# Patient Record
Sex: FEMALE | Race: WHITE | URBAN - METROPOLITAN AREA
[De-identification: names, ages, dates, MRNs, and addresses within clinical notes are randomized per-mention and may not be internally consistent; named-entity substitution may affect disease eponyms.]

---

## 2024-10-16 ENCOUNTER — OFFICE VISIT (OUTPATIENT)
Dept: GASTROENTEROLOGY | Facility: CLINIC | Age: 44
End: 2024-10-16

## 2024-10-16 VITALS
HEIGHT: 60 IN | SYSTOLIC BLOOD PRESSURE: 100 MMHG | DIASTOLIC BLOOD PRESSURE: 70 MMHG | BODY MASS INDEX: 27.29 KG/M2 | WEIGHT: 139 LBS

## 2024-10-16 DIAGNOSIS — K58.2 IRRITABLE BOWEL SYNDROME WITH BOTH CONSTIPATION AND DIARRHEA: ICD-10-CM

## 2024-10-16 DIAGNOSIS — A04.8 H. PYLORI INFECTION: ICD-10-CM

## 2024-10-16 DIAGNOSIS — R79.89 ELEVATED LFTS: ICD-10-CM

## 2024-10-16 DIAGNOSIS — R14.0 BLOATING: Primary | ICD-10-CM

## 2024-10-16 PROCEDURE — 99244 OFF/OP CNSLTJ NEW/EST MOD 40: CPT | Performed by: INTERNAL MEDICINE

## 2024-10-16 RX ORDER — LEVOTHYROXINE SODIUM 88 UG/1
1 TABLET ORAL
COMMUNITY
Start: 2024-10-01

## 2024-10-16 NOTE — PATIENT INSTRUCTIONS
1. Bloating  2. Irritable bowel syndrome with both constipation and diarrhea  3. H. pylori infection      Recommend:  RUQ US  Supplement fiber-- metamucil 1/2 dose daily to see if helps regular bowels  Continue water and exercise  Check Hpylori and celiac given irregular stools and elevated LFTs  Repeat LFTs in 3 months  If still elevated consider chronic liver disease workup and RUQ US

## 2024-10-16 NOTE — H&P
[facility] - Gastroenterology  Clinic History and Physical     Chief Complaint   Patient presents with    Consult     Referred for abnormal labs - does have abdominal discomfort        HPI:   Lady Hector Luis is a 44 year old female patient of Dr. Perales, with history of hashimotos, connective tissue disease, presenting for bloating and abnormal liver labs    Not on medication for connective tissue currently  Just thyroid medication  ALT 31    ARRON positive  Thyroid noted with positive ab to thyroglobulin and thyroid peroxidase    No family history of liver disease but does not know father's side  Denies alcohol-- rare social  No IV drugs and no hepatitis history  One tattoo in the store clean    Does have IBS and history of Hpylori  Irregular constipation and diarrhea  No blood in the stool    Patient denies any GI symptoms of nausea, vomiting, dyspepsia, dysphagia, hematemesis, abdominal pain, change in bowel habits, thin stools, hematochezia, or melena.  Additionally there is no weight loss and no reported history of chest pain or shortness of breath.    Family History:  Denies liver disease  Denies colon CA    Prior endoscopies:  colonoscopy-- 10 years  EGD 2 years ago    Soc:  Denies smoking or drugs  Rare alcohol       History, Medications, Allergies, ROS:      History reviewed. No pertinent past medical history.   History reviewed. No pertinent surgical history.   Family Hx: History reviewed. No pertinent family history.   Social History:   Social History     Socioeconomic History    Marital status: Single   Tobacco Use    Smoking status: Former     Types: Cigarettes    Smokeless tobacco: Never   Substance and Sexual Activity    Alcohol use: Not Currently    Drug use: Never     Social Drivers of Health     Financial Resource Strain: Low Risk  (10/8/2024)    Received from [facility]    Overall Financial Resource Strain (CARDIA)     Difficulty of Paying  Living Expenses: Not very hard   Food Insecurity: No Food Insecurity (10/8/2024)    Received from [facility]    Hunger Vital Sign     Worried About Running Out of Food in the Last Year: Never true     Ran Out of Food in the Last Year: Never true   Transportation Needs: No Transportation Needs (10/8/2024)    Received from [facility]    PRAPARE - Transportation     Lack of Transportation (Medical): No     Lack of Transportation (Non-Medical): No   Housing Stability: Low Risk  (10/8/2024)    Received from [facility]    Housing Stability Vital Sign     Unable to Pay for Housing in the Last Year: No     Number of Times Moved in the Last Year: 1     Homeless in the Last Year: No        Medications (Active prior to today's visit):  Current Outpatient Medications   Medication Sig Dispense Refill    levothyroxine 88 MCG Oral Tab Take 1 tablet (88 mcg total) by mouth before breakfast.         Allergies:  Allergies[1]    ROS:   CONSTITUTIONAL:  negative for fevers, rigors  EYES:  negative for diplopia   RESPIRATORY:  negative for severe shortness of breath  CARDIOVASCULAR:  negative for crushing sub-sternal chest pain  GASTROINTESTINAL:  see HPI  GENITOURINARY:  negative for dysuria or gross hematuria  INTEGUMENT/BREAST:  SKIN:  negative for jaundice   ALLERGIC/IMMUNOLOGIC:  negative for hay fever  ENDOCRINE:  negative for cold intolerance and heat intolerance  MUSCULOSKELETAL:  negative for joint effusion/severe erythema  BEHAVIOR/PSYCH:  negative for psychotic behavior      PHYSICAL EXAM:   /70 (BP Location: Right arm, Patient Position: Sitting, Cuff Size: adult)   Ht 5' (1.524 m)   Wt 139 lb (63 kg)   BMI 27.15 kg/m²       Gen: Patient appears comfortable and in no acute discomfort  HEENT: the sclera appears anicteric, oropharynx clear, mucus membranes appear moist  CV:  the extremities are warm and well perfused   Lung: Moves air well; No labored  breathing  Abdomen: soft, non-tender exam in all quadrants without rigidity or guarding, non-distended, no abnormal bowel sounds noted, no masses are palpated  Skin: No jaundice  Ext: no cyanosis, clubbing or edema is evident.   Neuro: Alert and interactive, and gross movements of extremities normal    Labs/Imaging:   Patient's labs and imaging were reviewed and discussed with patient today.      No results for input(s)  in the last 3 years  No results found for: \"GLU\", \"BUN\", \"BUNCREA\", \"CREATSERUM\", \"ANIONGAP\", \"GFR\", \"GFRNAA\", \"GFRAA\", \"CA\", \"OSMOCALC\", \"ALKPHO\", \"AST\", \"ALT\", \"ALKPHOS\", \"BILT\", \"TP\", \"ALB\", \"GLOBULIN\", \"AGRATIO\", \"NA\", \"K\", \"CL\", \"CO2\"  No results found for: \"PTP\", \"PT\", \"INR\"  ESR normal  Complement levels normal    ASSESSMENT/PLAN:       1. Bloating    2. Irritable bowel syndrome with both constipation and diarrhea    3. H. pylori infection      Recommend:  RUQ US  Supplement fiber-- metamucil 1/2 dose daily to see if helps regular bowels  Continue water and exercise  Check Hpylori and celiac given irregular stools and elevated LFTs  Repeat LFTs in 3 months  If still elevated consider chronic liver disease workup and RUQ US      Orders This Visit:  No orders of the defined types were placed in this encounter.      Meds This Visit:  Requested Prescriptions      No prescriptions requested or ordered in this encounter       Imaging & Referrals:  None         Yessenia Glasgow MD  10/16/2024         [1] No Known Allergies

## 2024-11-18 ENCOUNTER — HOSPITAL ENCOUNTER (OUTPATIENT)
Dept: ULTRASOUND IMAGING | Age: 44
Discharge: HOME | End: 2024-11-18
Attending: INTERNAL MEDICINE
Payer: COMMERCIAL

## 2024-11-18 DIAGNOSIS — R79.89 ELEVATED LFTS: ICD-10-CM

## 2024-11-18 PROCEDURE — 76705 ECHO EXAM OF ABDOMEN: CPT | Performed by: INTERNAL MEDICINE

## 2024-12-02 ENCOUNTER — TELEPHONE (OUTPATIENT)
Facility: CLINIC | Age: 44
End: 2024-12-02

## 2024-12-02 NOTE — TELEPHONE ENCOUNTER
RN called and spoke to pt with help of  [id number]. Informed pt that US was normal but recommended having the labs done that were ordered at time of October clinic appt.    Pt agreeable to doing testing. Discussed requirements for h pylori breath test. Pt verbalized understanding and had no further needs.    Time on call: 16 min

## 2024-12-18 ENCOUNTER — LAB ENCOUNTER (OUTPATIENT)
Dept: LAB | Facility: HOSPITAL | Age: 44
End: 2024-12-18
Attending: INTERNAL MEDICINE
Payer: COMMERCIAL

## 2024-12-18 DIAGNOSIS — R79.89 ELEVATED LFTS: ICD-10-CM

## 2024-12-18 DIAGNOSIS — L65.9 ALOPECIA, UNSPECIFIED: ICD-10-CM

## 2024-12-18 DIAGNOSIS — I51.9 MYXEDEMA HEART DISEASE: Primary | ICD-10-CM

## 2024-12-18 DIAGNOSIS — E88.819 INSULIN RESISTANCE: ICD-10-CM

## 2024-12-18 DIAGNOSIS — E03.9 MYXEDEMA HEART DISEASE: Primary | ICD-10-CM

## 2024-12-18 DIAGNOSIS — R53.83 FATIGUE: ICD-10-CM

## 2024-12-18 DIAGNOSIS — A04.8 H. PYLORI INFECTION: ICD-10-CM

## 2024-12-18 DIAGNOSIS — E55.9 AVITAMINOSIS D: ICD-10-CM

## 2024-12-18 DIAGNOSIS — E06.3 CHRONIC LYMPHOCYTIC THYROIDITIS: ICD-10-CM

## 2024-12-18 DIAGNOSIS — R14.0 BLOATING: ICD-10-CM

## 2024-12-18 DIAGNOSIS — E88.89 MADELUNG'S NECK (HCC): ICD-10-CM

## 2024-12-18 DIAGNOSIS — K58.2 IRRITABLE BOWEL SYNDROME WITH BOTH CONSTIPATION AND DIARRHEA: ICD-10-CM

## 2024-12-18 LAB
ALBUMIN SERPL-MCNC: 4.4 G/DL (ref 3.2–4.8)
ALBUMIN/GLOB SERPL: 1.5 {RATIO} (ref 1–2)
ALP LIVER SERPL-CCNC: 116 U/L
ALT SERPL-CCNC: 52 U/L
ANION GAP SERPL CALC-SCNC: 6 MMOL/L (ref 0–18)
AST SERPL-CCNC: 29 U/L (ref ?–34)
BILIRUB SERPL-MCNC: 0.4 MG/DL (ref 0.3–1.2)
BUN BLD-MCNC: 20 MG/DL (ref 9–23)
BUN/CREAT SERPL: 25.3 (ref 10–20)
CALCIUM BLD-MCNC: 9.4 MG/DL (ref 8.7–10.4)
CHLORIDE SERPL-SCNC: 110 MMOL/L (ref 98–112)
CO2 SERPL-SCNC: 23 MMOL/L (ref 21–32)
CREAT BLD-MCNC: 0.79 MG/DL
DEPRECATED HBV CORE AB SER IA-ACNC: 21 NG/ML
DHEA-S SERPL-MCNC: 104 UG/DL
EGFRCR SERPLBLD CKD-EPI 2021: 95 ML/MIN/1.73M2 (ref 60–?)
FASTING STATUS PATIENT QL REPORTED: YES
GLOBULIN PLAS-MCNC: 3 G/DL (ref 2–3.5)
GLUCOSE BLD-MCNC: 88 MG/DL (ref 70–99)
HAV IGM SER QL: NONREACTIVE
HBV CORE IGM SER QL: NONREACTIVE
HBV SURFACE AG SERPL QL IA: NONREACTIVE
HCV AB SERPL QL IA: NONREACTIVE
IGA SERPL-MCNC: 311.1 MG/DL (ref 70–312)
OSMOLALITY SERPL CALC.SUM OF ELEC: 290 MOSM/KG (ref 275–295)
POTASSIUM SERPL-SCNC: 3.8 MMOL/L (ref 3.5–5.1)
PROT SERPL-MCNC: 7.4 G/DL (ref 5.7–8.2)
SODIUM SERPL-SCNC: 139 MMOL/L (ref 136–145)
T3FREE SERPL-MCNC: 2.77 PG/ML (ref 2.4–4.2)
T4 FREE SERPL-MCNC: 1.4 NG/DL (ref 0.8–1.7)
TSI SER-ACNC: 1.82 UIU/ML (ref 0.55–4.78)

## 2024-12-18 PROCEDURE — 86364 TISS TRNSGLTMNASE EA IG CLAS: CPT

## 2024-12-18 PROCEDURE — 80053 COMPREHEN METABOLIC PANEL: CPT

## 2024-12-18 PROCEDURE — 83013 H PYLORI (C-13) BREATH: CPT | Performed by: INTERNAL MEDICINE

## 2024-12-18 PROCEDURE — 36415 COLL VENOUS BLD VENIPUNCTURE: CPT

## 2024-12-18 PROCEDURE — 84481 FREE ASSAY (FT-3): CPT

## 2024-12-18 PROCEDURE — 82627 DEHYDROEPIANDROSTERONE: CPT

## 2024-12-18 PROCEDURE — 82728 ASSAY OF FERRITIN: CPT

## 2024-12-18 PROCEDURE — 84681 ASSAY OF C-PEPTIDE: CPT

## 2024-12-18 PROCEDURE — 84443 ASSAY THYROID STIM HORMONE: CPT

## 2024-12-18 PROCEDURE — 82784 ASSAY IGA/IGD/IGG/IGM EACH: CPT

## 2024-12-18 PROCEDURE — 84403 ASSAY OF TOTAL TESTOSTERONE: CPT

## 2024-12-18 PROCEDURE — 80074 ACUTE HEPATITIS PANEL: CPT | Performed by: INTERNAL MEDICINE

## 2024-12-18 PROCEDURE — 84439 ASSAY OF FREE THYROXINE: CPT

## 2024-12-18 PROCEDURE — 84402 ASSAY OF FREE TESTOSTERONE: CPT

## 2024-12-19 LAB
C-PEPTIDE: 2.1 NG/ML
H PYLORI BREATH TEST: POSITIVE

## 2024-12-20 LAB
FREE TESTOST DIRECT: 1 PG/ML
TESTOSTERONE: 19 NG/DL
TTG IGA SER-ACNC: 0.8 U/ML (ref ?–7)

## 2024-12-22 RX ORDER — METRONIDAZOLE 250 MG/1
250 TABLET ORAL 4 TIMES DAILY
Qty: 56 TABLET | Refills: 0 | Status: SHIPPED | OUTPATIENT
Start: 2024-12-22 | End: 2025-01-05

## 2024-12-22 RX ORDER — TETRACYCLINE HYDROCHLORIDE 500 MG/1
500 CAPSULE ORAL 4 TIMES DAILY
Qty: 56 CAPSULE | Refills: 0 | Status: SHIPPED | OUTPATIENT
Start: 2024-12-22 | End: 2025-01-05

## 2024-12-22 RX ORDER — BISMUTH SUBSALICYLATE 262 MG/1
2 TABLET, CHEWABLE ORAL 4 TIMES DAILY
Qty: 112 TABLET | Refills: 0 | Status: SHIPPED | OUTPATIENT
Start: 2024-12-22 | End: 2025-01-05

## 2024-12-24 ENCOUNTER — TELEPHONE (OUTPATIENT)
Facility: CLINIC | Age: 44
End: 2024-12-24

## 2024-12-24 NOTE — TELEPHONE ENCOUNTER
RN called and spoke to pt with the help of  [id number]. Discussed + h pylori infection and recommended treatment. Pt states she picked up two medications from the pharmacy but has not started them yet. She picked up metronidazole and omeprazole. Instructed pt to NOT start meds until all 4 meds are in her possession.    RN called pharmacy, the tetracycline is covered by insurance and will cost approx $25 but med was out of stock and pharmacy had to order it. Med should arrive within the next day or so. Pt will need to purchase OTC pepto.    RN called pt again with  [id number], discussed the above information. Pt wrote down how to take the pepto and will buy it over the counter. Informed her we will contact her in 2 months when she is due for eradication testing. Pt verbalized understanding.    Recall H pylori testing in 10 weeks. Message sent to pt outreach.

## 2024-12-24 NOTE — TELEPHONE ENCOUNTER
----- Message from Yessenia Glasgow sent at 12/22/2024 12:38 PM CST -----  Hpylori positive  Please call and notify patient and medication ordered  Repeat breath test in 10 weeks after completing treatment

## 2024-12-27 ENCOUNTER — TELEPHONE (OUTPATIENT)
Facility: CLINIC | Age: 44
End: 2024-12-27

## 2024-12-27 NOTE — TELEPHONE ENCOUNTER
Spoke to patients   OK per SERGIO    Answered his questions regarding the labs  Assisted with setting up mychart with patient and her     They both verbalized understanding and had no further questions

## 2025-02-18 ENCOUNTER — TELEPHONE (OUTPATIENT)
Facility: CLINIC | Age: 45
End: 2025-02-18

## 2025-02-18 NOTE — TELEPHONE ENCOUNTER
1st,overdue reminder letter mailed out to patient   Labs order  Hepatic Function Panel (7) (Order #[id number]) on 10/16/24

## 2025-03-13 ENCOUNTER — TELEPHONE (OUTPATIENT)
Dept: GASTROENTEROLOGY | Facility: CLINIC | Age: 45
End: 2025-03-13

## 2025-03-13 DIAGNOSIS — A04.8 H. PYLORI INFECTION: Primary | ICD-10-CM

## 2025-03-14 NOTE — TELEPHONE ENCOUNTER
RN called and spoke to pt with help of , discussed that she is due for h pylori eradication testing.    Pt confirmed that she did complete 14 day course of medications but states she has moved to [place] (in February). Pt has established care with a PCP but not a GI yet.     Pt's new PCP is Dr. Sid Burgos, ph #[phone]. RN called and obtained fax #[phone]. Instructed pt to call PCP office next week to make sure order was received or PCP can place new order. Pt agreeable.    RN faxed h pylori breath test order, + result from 12/2024, and treatment used. Received confirmation that the fax was transmitted successfully.

## (undated) NOTE — LETTER
2/18/2025          Lady Hector Luis    [address] [facility] [address]         Dear ,    Our records indicate that the tests ordered for you by Yessenia Glasgow MD  have not been done.  If you have, in fact, already completed the tests or you do not wish to have the tests done, please contact our office at THE NUMBER LISTED BELOW.  Otherwise, please proceed with the testing.  Enclosed is a duplicate order for your convenience.    Labs Order:    Hepatic Function Panel (7) (Order #[id number]) on 10/16/24       Please call [facility] at [phone] to schedule this test order.      Sincerely,    Yessenia Glasgow MD  [facility], [address]  [facility] [address] [phone]